# Patient Record
Sex: MALE | Race: WHITE | NOT HISPANIC OR LATINO | Employment: FULL TIME | URBAN - METROPOLITAN AREA
[De-identification: names, ages, dates, MRNs, and addresses within clinical notes are randomized per-mention and may not be internally consistent; named-entity substitution may affect disease eponyms.]

---
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
## 2019-02-05 ENCOUNTER — HOSPITAL ENCOUNTER (EMERGENCY)
Facility: CLINIC | Age: 19
Discharge: HOME | End: 2019-02-05
Attending: NURSE PRACTITIONER | Admitting: NURSE PRACTITIONER
Payer: COMMERCIAL

## 2019-02-05 VITALS
DIASTOLIC BLOOD PRESSURE: 80 MMHG | TEMPERATURE: 97.7 F | WEIGHT: 210 LBS | RESPIRATION RATE: 18 BRPM | SYSTOLIC BLOOD PRESSURE: 147 MMHG | BODY MASS INDEX: 30.06 KG/M2 | HEIGHT: 70 IN | OXYGEN SATURATION: 96 %

## 2019-02-05 DIAGNOSIS — J06.9 VIRAL URI WITH COUGH: ICD-10-CM

## 2019-02-05 PROCEDURE — G0463 HOSPITAL OUTPT CLINIC VISIT: HCPCS | Performed by: NURSE PRACTITIONER

## 2019-02-05 PROCEDURE — 99214 OFFICE O/P EST MOD 30 MIN: CPT | Mod: Z6 | Performed by: NURSE PRACTITIONER

## 2019-02-05 RX ORDER — ALBUTEROL SULFATE 90 UG/1
2 AEROSOL, METERED RESPIRATORY (INHALATION) EVERY 6 HOURS PRN
Qty: 1 INHALER | Refills: 0 | Status: SHIPPED | OUTPATIENT
Start: 2019-02-05 | End: 2019-03-07

## 2019-02-05 ASSESSMENT — ENCOUNTER SYMPTOMS
VOMITING: 0
SORE THROAT: 0
SPEECH DIFFICULTY: 0
RHINORRHEA: 1
CONSTIPATION: 0
DYSURIA: 0
WHEEZING: 0
NUMBNESS: 0
DIAPHORESIS: 0
EYE PAIN: 0
CHILLS: 0
SINUS PRESSURE: 1
SINUS PAIN: 0
DIARRHEA: 0
DIFFICULTY URINATING: 0
NAUSEA: 0
WOUND: 0
SHORTNESS OF BREATH: 1
COUGH: 1
FEVER: 0
WEAKNESS: 0
ABDOMINAL PAIN: 0
CONFUSION: 0

## 2019-02-05 ASSESSMENT — MIFFLIN-ST. JEOR: SCORE: 1973.8

## 2019-02-05 NOTE — ED AVS SNAPSHOT
[facility] Emergency Department  [address] [address]  Phone:  [phone]  Fax:  [phone]                                    Pete Blank   MRN: [id number]    Department:  [facility] Emergency Department   Date of Visit:  2/5/2019           After Visit Summary Signature Page    I have received my discharge instructions, and my questions have been answered. I have discussed any challenges I see with this plan with the nurse or doctor.    ..........................................................................................................................................  Patient/Patient Representative Signature      ..........................................................................................................................................  Patient Representative Print Name and Relationship to Patient    ..................................................               ................................................  Date                                   Time    ..........................................................................................................................................  Reviewed by Signature/Title    ...................................................              ..............................................  Date                                               Time          22EPIC Rev 08/18

## 2019-02-05 NOTE — LETTER
February 5, 2019      To Whom It May Concern:      Pete Blank was seen in our Emergency Department today, 02/05/19.  I expect his condition to improve over the next few days.  He may return to work/school when improved.    Sincerely,        SCOTT Blanco CNP

## 2019-02-05 NOTE — ED PROVIDER NOTES
"  History     Chief Complaint   Patient presents with     URI     HPI  SUBJECTIVE: Pete Blank  is here today because of:Cough and \"Cold\"  The patient has had symptoms of cough, sore throat, nasal congestion/runny nose, headache, facial pressure and shortness of breath.   Onset of symptoms was 2 days ago. Course of illness is same.  Patient admits to exposure to illness at home or work/school.   Patient denies fever, earache, nausea, vomiting, diarrhea, sinus pain, chest congestion, wheezing and myalgias  Treatment measures tried include airborne.  Patient is a smoker    Allergies:  Allergies   Allergen Reactions     Nkda [No Known Drug Allergies]        Problem List:    Patient Active Problem List    Diagnosis Date Noted     Depression 12/09/2014     Priority: Medium     Prozac was never started. Wellbutrin has been the medication since the summer of 2014.        Suicidal ideation 06/26/2014     Priority: Medium     Attention deficit hyperactivity disorder (ADHD) 02/23/2007     Priority: Medium     01/10/07: Doing well on Adderall XR 20 mg qd.    10/3/2008  New school this year.  Increase in oppositional behaviors, impulsive and fidgeting, and difficulty with peers on follow up [place].   11/8/2011  Currently taking adderall Xr 30mg and Adderall 15 mg q pm.  Having more late morning and afternoon sx.  Increase to 40mg XR am and continue to adjust pm dose as needed.   5/7/2012  Multiple slightly early refill noted.  rx filled for 90 day with 3 predated rx given today.  Refills discussed.  11/28/2012  Adderall XR 40mg q am and Adderall 15mg q am and 10mg q pm.     2/5/2013  Due to poor afternoon performance dose changed to Adderall XR 20mg tablet 2 tab am and noon with one month follow up.   3/7/2013  rx tampering suspected.  See recent note.  No further stimulant other than current trial of daytrana.  All rx to be carried to [facility] pharmacy and filled there.  No written rx given to parents.  No other provider " to fill unless arranged due to provider absence.  Follow up 2 wk with mame from school.   4/25/2013  Follow up visit.  Daytrana 15mg rx taken to pharmacy one month supply.  Follow up with BP  6/25/2013  Increase to Daytrana 20.  Mother to bring BP reading from home.  Follow up 2-3 wk with new dose.     He was at [facility] in the summer of 2014. He was tried on Wellbutrin 300 mg daily and this was not effective.   Problem list name updated by automated process. Provider to review          Past Medical History:    Past Medical History:   Diagnosis Date     Attention deficit disorder without mention of hyperactivity        Past Surgical History:    Past Surgical History:   Procedure Laterality Date     MYRINGOTOMY, INSERT TUBE(S), ADENOIDECTOMY, COMBINED  2002       Family History:    Family History   Problem Relation Age of Onset     Asthma Mother      Allergies Mother      Cancer Maternal Grandmother         lung     Cancer Maternal Grandfather         prostate     C.A.D. No family hx of      Diabetes No family hx of      Hypertension No family hx of      Psychotic Disorder No family hx of        Social History:  Marital Status:  Single [1]  Social History     Tobacco Use     Smoking status: Current Every Day Smoker     Types: Cigarettes     Smokeless tobacco: Never Used     Tobacco comment: 8 per day-started a few months ago   Substance Use Topics     Alcohol use: No     Drug use: No        Medications:      albuterol (PROAIR HFA/PROVENTIL HFA/VENTOLIN HFA) 108 (90 Base) MCG/ACT inhaler   cetirizine (ZYRTEC) 10 MG tablet         Review of Systems   Constitutional: Negative for chills, diaphoresis and fever.   HENT: Positive for congestion, rhinorrhea and sinus pressure. Negative for ear pain, sinus pain and sore throat.    Eyes: Negative for pain and visual disturbance.   Respiratory: Positive for cough and shortness of breath. Negative for wheezing.    Cardiovascular: Negative for chest pain.  "  Gastrointestinal: Negative for abdominal pain, constipation, diarrhea, nausea and vomiting.   Genitourinary: Negative for difficulty urinating and dysuria.   Skin: Negative for rash and wound.   Neurological: Negative for speech difficulty, weakness and numbness.   Psychiatric/Behavioral: Negative for confusion and self-injury.   All other systems reviewed and are negative.      Physical Exam   BP: 147/80  Heart Rate: 95  Temp: 97.7  F (36.5  C)  Resp: 18  Height: 177.8 cm (5' 10\")  Weight: 95.3 kg (210 lb)  SpO2: 96 %      Physical Exam   Constitutional: He is oriented to person, place, and time. He appears well-developed and well-nourished. He is cooperative.  Non-toxic appearance. He appears ill.   HENT:   Head: Normocephalic and atraumatic. Head is without contusion.   Right Ear: Hearing, tympanic membrane, external ear and ear canal normal. No drainage or tenderness.   Left Ear: Hearing, external ear and ear canal normal. No drainage or tenderness.   Nose: Mucosal edema and rhinorrhea present. Right sinus exhibits no maxillary sinus tenderness and no frontal sinus tenderness. Left sinus exhibits no maxillary sinus tenderness and no frontal sinus tenderness.   Mouth/Throat: Uvula is midline and mucous membranes are normal. No trismus in the jaw. No uvula swelling. No posterior oropharyngeal edema or posterior oropharyngeal erythema. Tonsils are 1+ on the right. Tonsils are 1+ on the left. No tonsillar exudate.   Eyes: Conjunctivae are normal. Pupils are equal, round, and reactive to light. Right eye exhibits no discharge. Left eye exhibits no discharge. No scleral icterus.   Neck: Normal range of motion. No tracheal deviation present.   Cardiovascular: Normal rate, regular rhythm, normal heart sounds and intact distal pulses. Exam reveals no gallop and no friction rub.   No murmur heard.  Pulmonary/Chest: Effort normal. No stridor. No respiratory distress. He has wheezes. He has no rales.   Lymphadenopathy:    "  He has cervical adenopathy (shotty lymph nodes).   Neurological: He is alert and oriented to person, place, and time.   Skin: Skin is warm. Capillary refill takes less than 2 seconds. No rash noted. He is not diaphoretic.   Psychiatric: He has a normal mood and affect.   Nursing note and vitals reviewed.      ED Course        Procedures    No results found for this or any previous visit (from the past 24 hour(s)).    Medications - No data to display    Assessments & Plan (with Medical Decision Making)     I have reviewed the nursing notes.    I have reviewed the findings, diagnosis, plan and need for follow up with the patient.  Medical Decision Making:  CXR is not indicated.  Rapid Strep test is not indicated.     Assessment:  1) Viral upper respiratory illness, Tobacco dependency and wheezing.    PLAN:  Albuterol prn  Use increase fluids and rest.   Follow up with any questions or problems         Medication List      Started    albuterol 108 (90 Base) MCG/ACT inhaler  Commonly known as:  PROAIR HFA/PROVENTIL HFA/VENTOLIN HFA  2 puffs, Inhalation, EVERY 6 HOURS PRN            Final diagnoses:   Viral URI with cough       2/5/2019   [facility] EMERGENCY DEPARTMENT     Angie Benavides, SCOTT CNP  02/05/19 0091

## 2021-10-11 ENCOUNTER — OFFICE VISIT (OUTPATIENT)
Dept: URGENT CARE | Facility: URGENT CARE | Age: 21
End: 2021-10-11
Payer: COMMERCIAL

## 2021-10-11 VITALS
HEART RATE: 104 BPM | TEMPERATURE: 97.8 F | DIASTOLIC BLOOD PRESSURE: 90 MMHG | RESPIRATION RATE: 18 BRPM | SYSTOLIC BLOOD PRESSURE: 124 MMHG | OXYGEN SATURATION: 98 %

## 2021-10-11 DIAGNOSIS — Z20.822 SUSPECTED 2019 NOVEL CORONAVIRUS INFECTION: Primary | ICD-10-CM

## 2021-10-11 DIAGNOSIS — R03.0 ELEVATED BP WITHOUT DIAGNOSIS OF HYPERTENSION: ICD-10-CM

## 2021-10-11 PROCEDURE — 99000 SPECIMEN HANDLING OFFICE-LAB: CPT | Performed by: PHYSICIAN ASSISTANT

## 2021-10-11 PROCEDURE — U0005 INFEC AGEN DETEC AMPLI PROBE: HCPCS | Mod: 90 | Performed by: PHYSICIAN ASSISTANT

## 2021-10-11 PROCEDURE — 99203 OFFICE O/P NEW LOW 30 MIN: CPT | Performed by: PHYSICIAN ASSISTANT

## 2021-10-11 PROCEDURE — U0003 INFECTIOUS AGENT DETECTION BY NUCLEIC ACID (DNA OR RNA); SEVERE ACUTE RESPIRATORY SYNDROME CORONAVIRUS 2 (SARS-COV-2) (CORONAVIRUS DISEASE [COVID-19]), AMPLIFIED PROBE TECHNIQUE, MAKING USE OF HIGH THROUGHPUT TECHNOLOGIES AS DESCRIBED BY CMS-2020-01-R: HCPCS | Mod: 90 | Performed by: PHYSICIAN ASSISTANT

## 2021-10-11 NOTE — LETTER
[facility] URGENT CARE [place] [id number] [address]  Phone: [phone]  Fax: [phone]    October 11, 2021        Pete Blank  [address] [address]          To whom it may concern:    RE: Pete Blank    Patient was seen and treated today at our clinic and missed work 10/11/21 through 10/15/21.    Please contact me for questions or concerns.      Sincerely,        Leida Hoskins PA-C

## 2021-10-11 NOTE — PROGRESS NOTES
Assessment & Plan     Suspected 2019 novel coronavirus infection  COVID pending. Continue with supportive care. Get plenty of rest and push fluids. Can use Tylenol and/or ibuprofen as needed for pain and/or fever control. Discussed quarantine guidelines. Return to clinic if symptoms worsen or do not improve; otherwise follow up as needed       - Symptomatic COVID-19 Virus (Coronavirus) by PCR Nasopharyngeal    Elevated BP without diagnosis of hypertension  Monitor blood pressure at home and if average pressure is greater than or equal to 140/90 follow up with primary care provider                 Tobacco Cessation:   reports that he has been smoking cigarettes. He has never used smokeless tobacco.          Return in about 1 week (around 10/18/2021), or if symptoms worsen or fail to improve.    VIRGINIA Bautista [facility] URGENT CARE [place]          Subjective   Chief Complaint   Patient presents with     Cough     3 days cough, loss of taste smell, vomiting, sob, upset stomach, sore throat, congestion.         HPI     URI Adult    Onset of symptoms was 3 day(s) ago.  Course of illness is same.    Severity moderate  Current and Associated symptoms: cough, congestion, sore throat, loss of taste  Treatment measures tried include Tylenol/Ibuprofen.  Predisposing factors include None.  Had COVID last year              Review of Systems   Constitutional, HEENT, cardiovascular, pulmonary, gi and gu systems are negative, except as otherwise noted.      Objective    BP (!) 124/90 (BP Location: Right arm, Patient Position: Sitting, Cuff Size: Adult Large)   Pulse 104   Temp 97.8  F (36.6  C) (Tympanic)   Resp 18   SpO2 98%   There is no height or weight on file to calculate BMI.  Physical Exam  Constitutional:       General: He is not in acute distress.     Appearance: He is well-developed.   HENT:      Head: Normocephalic and atraumatic.      Right Ear: Tympanic membrane and ear canal normal.       Left Ear: Tympanic membrane and ear canal normal.   Eyes:      Conjunctiva/sclera: Conjunctivae normal.   Cardiovascular:      Rate and Rhythm: Normal rate and regular rhythm.   Pulmonary:      Effort: Pulmonary effort is normal.      Breath sounds: Normal breath sounds.   Skin:     General: Skin is warm and dry.      Findings: No rash.   Psychiatric:         Behavior: Behavior normal.

## 2021-10-13 LAB — SARS-COV-2 RNA RESP QL NAA+PROBE: NOT DETECTED

## 2023-03-07 ENCOUNTER — HOSPITAL ENCOUNTER (EMERGENCY)
Facility: CLINIC | Age: 23
Discharge: HOME | End: 2023-03-07
Attending: FAMILY MEDICINE | Admitting: FAMILY MEDICINE
Payer: COMMERCIAL

## 2023-03-07 VITALS
HEART RATE: 72 BPM | WEIGHT: 225 LBS | HEIGHT: 72 IN | TEMPERATURE: 98.2 F | SYSTOLIC BLOOD PRESSURE: 148 MMHG | OXYGEN SATURATION: 97 % | BODY MASS INDEX: 30.48 KG/M2 | DIASTOLIC BLOOD PRESSURE: 107 MMHG | RESPIRATION RATE: 16 BRPM

## 2023-03-07 DIAGNOSIS — K04.7 DENTAL INFECTION: ICD-10-CM

## 2023-03-07 PROCEDURE — 99283 EMERGENCY DEPT VISIT LOW MDM: CPT | Performed by: FAMILY MEDICINE

## 2023-03-07 ASSESSMENT — ACTIVITIES OF DAILY LIVING (ADL): ADLS_ACUITY_SCORE: 35

## 2023-03-07 NOTE — LETTER
March 7, 2023      To Whom It May Concern:      Pete LORI Blank was seen in our Emergency Department today, 03/07/23.    Sincerely,        Yuridia Gomez RN

## 2023-03-07 NOTE — ED TRIAGE NOTES
"Pt here with left lower tooth pain, possible abscess. Pt has not tried to see a dentist. Pt states that this has happened before, \"I took a couple of the amoxicillin I had left from last time, but I need more of that\".      Triage Assessment     Row Name 03/07/23 1037       Triage Assessment (Adult)    Airway WDL WDL       Respiratory WDL    Respiratory WDL WDL       Skin Circulation/Temperature WDL    Skin Circulation/Temperature WDL WDL       Cardiac WDL    Cardiac WDL WDL       Peripheral/Neurovascular WDL    Peripheral Neurovascular WDL WDL       Cognitive/Neuro/Behavioral WDL    Cognitive/Neuro/Behavioral WDL WDL              "

## 2023-03-07 NOTE — DISCHARGE INSTRUCTIONS
Augmentin 875 mg p.o. twice daily x10 days.  Please make an appointment to see a dental provider as soon as possible as we discussed.  Tylenol/ibuprofen as needed for pain.  Return to the emergency department if worse or changes.

## 2023-03-07 NOTE — ED PROVIDER NOTES
History     Chief Complaint   Patient presents with     Dental Problem     HPI  Pete Blank is a 22 year old male, past medical history is significant for depression, ADHD, presents to the emergency department with concerns of dental pain and swelling left mandible area.  He is aware that he has widespread dental caries and needs to see an oral surgeon but has not made an appointment thus far.  He denies any fever chills or sweats no nausea or vomiting.  About 2 weeks ago he had 4 days left of Augmentin and reports improvement on that with duration of symptoms stuttering over the past month or so.  Unfortunately he did not have a full course of Augmentin and now presents with reaccumulation or rather increased pain and swelling in the left mandible area.    Allergies:  Allergies   Allergen Reactions     Nkda [No Known Drug Allergies]        Problem List:    Patient Active Problem List    Diagnosis Date Noted     Depression 12/09/2014     Priority: Medium     Prozac was never started. Wellbutrin has been the medication since the summer of 2014.        Suicidal ideation 06/26/2014     Priority: Medium     Attention deficit hyperactivity disorder (ADHD) 02/23/2007     Priority: Medium     01/10/07: Doing well on Adderall XR 20 mg qd.    10/3/2008  New school this year.  Increase in oppositional behaviors, impulsive and fidgeting, and difficulty with peers on follow up [place].   11/8/2011  Currently taking adderall Xr 30mg and Adderall 15 mg q pm.  Having more late morning and afternoon sx.  Increase to 40mg XR am and continue to adjust pm dose as needed.   5/7/2012  Multiple slightly early refill noted.  rx filled for 90 day with 3 predated rx given today.  Refills discussed.  11/28/2012  Adderall XR 40mg q am and Adderall 15mg q am and 10mg q pm.     2/5/2013  Due to poor afternoon performance dose changed to Adderall XR 20mg tablet 2 tab am and noon with one month follow up.   3/7/2013  rx tampering  suspected.  See recent note.  No further stimulant other than current trial of daytrana.  All rx to be carried to [facility] pharmacy and filled there.  No written rx given to parents.  No other provider to fill unless arranged due to provider absence.  Follow up 2 wk with mame from school.   4/25/2013  Follow up visit.  Daytrana 15mg rx taken to pharmacy one month supply.  Follow up with BP  6/25/2013  Increase to Daytrana 20.  Mother to bring BP reading from home.  Follow up 2-3 wk with new dose.     He was at [facility] in the summer of 2014. He was tried on Wellbutrin 300 mg daily and this was not effective.   Problem list name updated by automated process. Provider to review          Past Medical History:    Past Medical History:   Diagnosis Date     Attention deficit disorder without mention of hyperactivity        Past Surgical History:    Past Surgical History:   Procedure Laterality Date     MYRINGOTOMY, INSERT TUBE(S), ADENOIDECTOMY, COMBINED  2002       Family History:    Family History   Problem Relation Age of Onset     Asthma Mother      Allergies Mother      Cancer Maternal Grandmother         lung     Cancer Maternal Grandfather         prostate     C.A.D. No family hx of      Diabetes No family hx of      Hypertension No family hx of      Psychotic Disorder No family hx of        Social History:  Marital Status:  Single [1]  Social History     Tobacco Use     Smoking status: Every Day     Types: Cigarettes     Smokeless tobacco: Never     Tobacco comments:     8 per day-started a few months ago   Substance Use Topics     Alcohol use: No     Drug use: No        Medications:    amoxicillin-clavulanate (AUGMENTIN) 875-125 MG tablet  albuterol (PROAIR HFA/PROVENTIL HFA/VENTOLIN HFA) 108 (90 Base) MCG/ACT inhaler  cetirizine (ZYRTEC) 10 MG tablet          Review of Systems   All other systems reviewed and are negative.      Physical Exam   BP: (!) 148/107  Pulse: 72  Temp: 98.2  F (36.8  C)  Resp:  16  Height: 182.9 cm (6')  Weight: 102.1 kg (225 lb)  SpO2: 97 %      Physical Exam  Vitals and nursing note reviewed.   Constitutional:       General: He is not in acute distress.     Appearance: Normal appearance. He is normal weight. He is not ill-appearing.   HENT:      Head: Normocephalic and atraumatic.      Right Ear: Tympanic membrane normal.      Left Ear: Tympanic membrane normal.      Nose: Nose normal.      Mouth/Throat:      Pharynx: Oropharynx is clear.      Comments: Widespread dental caries most of the dentition on the left mandible area is eroded down to the gumline.  There is diffuse erythema but no fluctuance to suggest abscess.  Tender to palpation.  Tender anterior and posterior left-sided cervical lymphadenopathy  Eyes:      Conjunctiva/sclera: Conjunctivae normal.      Pupils: Pupils are equal, round, and reactive to light.   Neurological:      Mental Status: He is alert.         ED Course                 Procedures              Critical Care time:  none               No results found for this or any previous visit (from the past 24 hour(s)).    Medications - No data to display    Assessments & Plan (with Medical Decision Making)   22-year-old male with widespread dental caries who presents with concerns of pain and swelling in his left mandible area partial treatment with Augmentin as discussed in the HPI and documented with respect abnormals on exam.  No definite abscess at this point.  We will place the patient back on Augmentin for a full 10-day course and refer to oral surgery as I do not think with the degree of dental decay that this will be something that a dentist would be able to do.  I discussed this with him.  Return criteria for the ER were discussed.      Disclaimer: This note consists of symbols derived from keyboarding, dictation and/or voice recognition software. As a result, there may be errors in the script that have gone undetected. Please consider this when interpreting  information found in this chart.      I have reviewed the nursing notes.    I have reviewed the findings, diagnosis, plan and need for follow up with the patient.             New Prescriptions    AMOXICILLIN-CLAVULANATE (AUGMENTIN) 875-125 MG TABLET    Take 1 tablet by mouth 2 times daily for 10 days       Final diagnoses:   Dental infection       3/7/2023   [facility] EMERGENCY DEPT     Darío Redding MD  03/07/23 [id number]

## 2025-03-29 ENCOUNTER — HOSPITAL ENCOUNTER (EMERGENCY)
Facility: CLINIC | Age: 25
Discharge: HOME | End: 2025-03-29
Attending: PHYSICIAN ASSISTANT | Admitting: PHYSICIAN ASSISTANT
Payer: COMMERCIAL

## 2025-03-29 ENCOUNTER — HOSPITAL ENCOUNTER (EMERGENCY)
Facility: CLINIC | Age: 25
End: 2025-03-29

## 2025-03-29 VITALS
HEART RATE: 89 BPM | DIASTOLIC BLOOD PRESSURE: 87 MMHG | RESPIRATION RATE: 18 BRPM | OXYGEN SATURATION: 98 % | SYSTOLIC BLOOD PRESSURE: 132 MMHG | TEMPERATURE: 97.5 F

## 2025-03-29 DIAGNOSIS — J02.9 ACUTE PHARYNGITIS: ICD-10-CM

## 2025-03-29 DIAGNOSIS — J06.9 URI (UPPER RESPIRATORY INFECTION): ICD-10-CM

## 2025-03-29 LAB — S PYO DNA THROAT QL NAA+PROBE: NOT DETECTED

## 2025-03-29 PROCEDURE — G0463 HOSPITAL OUTPT CLINIC VISIT: HCPCS | Performed by: PHYSICIAN ASSISTANT

## 2025-03-29 PROCEDURE — 99213 OFFICE O/P EST LOW 20 MIN: CPT | Performed by: PHYSICIAN ASSISTANT

## 2025-03-29 PROCEDURE — 87651 STREP A DNA AMP PROBE: CPT | Performed by: PHYSICIAN ASSISTANT

## 2025-03-29 RX ORDER — BUPROPION HYDROCHLORIDE 150 MG/1
150 TABLET ORAL
COMMUNITY
Start: 2025-01-24

## 2025-03-29 ASSESSMENT — ACTIVITIES OF DAILY LIVING (ADL)
ADLS_ACUITY_SCORE: 41
ADLS_ACUITY_SCORE: 41

## 2025-03-29 ASSESSMENT — COLUMBIA-SUICIDE SEVERITY RATING SCALE - C-SSRS
6. HAVE YOU EVER DONE ANYTHING, STARTED TO DO ANYTHING, OR PREPARED TO DO ANYTHING TO END YOUR LIFE?: NO
2. HAVE YOU ACTUALLY HAD ANY THOUGHTS OF KILLING YOURSELF IN THE PAST MONTH?: NO
1. IN THE PAST MONTH, HAVE YOU WISHED YOU WERE DEAD OR WISHED YOU COULD GO TO SLEEP AND NOT WAKE UP?: NO
6. HAVE YOU EVER DONE ANYTHING, STARTED TO DO ANYTHING, OR PREPARED TO DO ANYTHING TO END YOUR LIFE?: NO
2. HAVE YOU ACTUALLY HAD ANY THOUGHTS OF KILLING YOURSELF IN THE PAST MONTH?: NO
1. IN THE PAST MONTH, HAVE YOU WISHED YOU WERE DEAD OR WISHED YOU COULD GO TO SLEEP AND NOT WAKE UP?: NO

## 2025-03-29 ASSESSMENT — ENCOUNTER SYMPTOMS
COUGH: 1
CONSTITUTIONAL NEGATIVE: 1
SORE THROAT: 1
FEVER: 0

## 2025-03-29 NOTE — LETTER
March 29, 2025      To Whom It May Concern:      Pete Blank was seen in our Urgent Care today, 03/29/25.  Please excuse from work today and tomorrow due to illness.  Thank you.      Sincerely,        Natali Vizcaino PA-C  Electronically signed

## 2025-03-29 NOTE — ED PROVIDER NOTES
History     Chief Complaint   Patient presents with    Pharyngitis     Requesting strep test today     Letter for School/Work     HPI  Pete Blank is a 24 year old male who presents to Urgent Care with complaints of sore throat for the past 3 days.  Patient also complains of nasal congestion and cough.  Wanting to rule out strep throat.  Denies fevers, chills, rash, neck pain/stiffness, nausea, vomiting, diarrhea, abdominal pain, chest pain, or shortness of breath.      Allergies:  Allergies   Allergen Reactions    Nkda [No Known Drug Allergy]        Problem List:    Patient Active Problem List    Diagnosis Date Noted    Depression 12/09/2014     Priority: Medium     Prozac was never started. Wellbutrin has been the medication since the summer of 2014.       Suicidal ideation 06/26/2014     Priority: Medium    Attention deficit hyperactivity disorder (ADHD) 02/23/2007     Priority: Medium     01/10/07: Doing well on Adderall XR 20 mg qd.    10/3/2008  New school this year.  Increase in oppositional behaviors, impulsive and fidgeting, and difficulty with peers on follow up mame.   11/8/2011  Currently taking adderall Xr 30mg and Adderall 15 mg q pm.  Having more late morning and afternoon sx.  Increase to 40mg XR am and continue to adjust pm dose as needed.   5/7/2012  Multiple slightly early refill noted.  rx filled for 90 day with 3 predated rx given today.  Refills discussed.  11/28/2012  Adderall XR 40mg q am and Adderall 15mg q am and 10mg q pm.     2/5/2013  Due to poor afternoon performance dose changed to Adderall XR 20mg tablet 2 tab am and noon with one month follow up.   3/7/2013  rx tampering suspected.  See recent note.  No further stimulant other than current trial of daytrana.  [facility].  No written rx given to parents.  No other provider to fill unless arranged due to provider absence.  Follow up 2 wk with mame from school.    4/25/2013  Follow up visit.  Daytrana 15mg rx taken to pharmacy one month supply.  Follow up with BP  6/25/2013  Increase to Daytrana 20.  Mother to bring BP reading from home.  Follow up 2-3 wk with new dose.     He was at [facility] in the summer of 2014. He was tried on Wellbutrin 300 mg daily and this was not effective.   Problem list name updated by automated process. Provider to review          Past Medical History:    Past Medical History:   Diagnosis Date    Attention deficit disorder without mention of hyperactivity        Past Surgical History:    Past Surgical History:   Procedure Laterality Date    MYRINGOTOMY, INSERT TUBE(S), ADENOIDECTOMY, COMBINED  2002       Family History:    Family History   Problem Relation Age of Onset    Asthma Mother     Allergies Mother     Cancer Maternal Grandmother         lung    Cancer Maternal Grandfather         prostate    C.A.D. No family hx of     Diabetes No family hx of     Hypertension No family hx of     Psychotic Disorder No family hx of        Social History:  Marital Status:  Single [1]  Social History     Tobacco Use    Smoking status: Every Day     Types: Cigarettes    Smokeless tobacco: Never    Tobacco comments:     8 per day-started a few months ago   Substance Use Topics    Alcohol use: No    Drug use: No        Medications:    buPROPion (WELLBUTRIN XL) 150 MG 24 hr tablet  albuterol (PROAIR HFA/PROVENTIL HFA/VENTOLIN HFA) 108 (90 Base) MCG/ACT inhaler  cetirizine (ZYRTEC) 10 MG tablet          Review of Systems   Constitutional: Negative.  Negative for fever.   HENT:  Positive for congestion and sore throat.    Respiratory:  Positive for cough.    All other systems reviewed and are negative.      Physical Exam   BP: 132/87  Pulse: 89  Temp: 97.5  F (36.4  C)  Resp: 18  SpO2: 98 %      Physical Exam  Constitutional:       General: He is not in acute distress.     Appearance: Normal appearance. He is well-developed. He is not ill-appearing,  toxic-appearing or diaphoretic.   HENT:      Head: Normocephalic and atraumatic.      Right Ear: Tympanic membrane, ear canal and external ear normal.      Left Ear: Tympanic membrane, ear canal and external ear normal.      Nose: Congestion and rhinorrhea present.      Mouth/Throat:      Lips: Pink.      Mouth: Mucous membranes are moist.      Pharynx: Uvula midline. Posterior oropharyngeal erythema present. No pharyngeal swelling, oropharyngeal exudate, uvula swelling or postnasal drip.      Tonsils: No tonsillar exudate or tonsillar abscesses.   Eyes:      Extraocular Movements: Extraocular movements intact.      Conjunctiva/sclera: Conjunctivae normal.      Pupils: Pupils are equal, round, and reactive to light.   Cardiovascular:      Rate and Rhythm: Normal rate and regular rhythm.      Heart sounds: Normal heart sounds.   Pulmonary:      Effort: Pulmonary effort is normal. No respiratory distress.      Breath sounds: Normal breath sounds. No stridor. No wheezing, rhonchi or rales.   Musculoskeletal:      Cervical back: Full passive range of motion without pain, normal range of motion and neck supple. No rigidity. Normal range of motion.   Lymphadenopathy:      Cervical: No cervical adenopathy.   Skin:     General: Skin is warm and dry.   Neurological:      Mental Status: He is alert and oriented to person, place, and time.   Psychiatric:         Behavior: Behavior is cooperative.         ED Course        Procedures      Results for orders placed or performed during the hospital encounter of 03/29/25 (from the past 24 hours)   Group A Streptococcus PCR Throat Swab    Specimen: Throat; Swab   Result Value Ref Range    Group A strep by PCR Not Detected Not Detected    Narrative    The Xpert Xpress Strep A test, performed on the [employer], is a rapid, qualitative in vitro diagnostic test for the detection of Streptococcus pyogenes (Group A ß-hemolytic Streptococcus, Strep A) in throat swab  specimens from patients with signs and symptoms of pharyngitis. The Xpert Xpress Strep A test can be used as an aid in the diagnosis of Group A Streptococcal pharyngitis. The assay is not intended to monitor treatment for Group A Streptococcus infections. The Xpert Xpress Strep A test utilizes an automated real-time polymerase chain reaction (PCR) to detect Streptococcus pyogenes DNA.       Medications - No data to display    Assessments & Plan (with Medical Decision Making)     Pt is a 24 year old male who presents to Urgent Care with complaints of sore throat for the past 3 days.  Patient also complains of nasal congestion and cough.      Pt is afebrile on arrival.  Exam as above.  Strep testing was negative.  Discussed results with patient.  Encouraged symptomatic treatments at home.  Return precautions were reviewed.  Hand-outs were provided.    Patient was instructed to follow-up with PCP for continued care and management.  He is to return to the ED for persistent and/or worsening symptoms.  Patient expressed understanding of the diagnosis and plan and was discharged home in good condition.    I have reviewed the nursing notes.    I have reviewed the findings, diagnosis, plan and need for follow up with the patient.      New Prescriptions    No medications on file       Final diagnoses:   Acute pharyngitis   URI (upper respiratory infection)       3/29/2025   [facility] EMERGENCY DEPT      Disclaimer:  This note consists of symbols derived from keyboarding, dictation and/or voice recognition software.  As a result, there may be errors in the script that have gone undetected.  Please consider this when interpreting information found in this chart.     Natali Vizcaino PA-C  03/29/25 1242

## 2025-04-19 ENCOUNTER — HEALTH MAINTENANCE LETTER (OUTPATIENT)
Age: 25
End: 2025-04-19

## 2025-05-11 ENCOUNTER — HOSPITAL ENCOUNTER (EMERGENCY)
Facility: CLINIC | Age: 25
Discharge: HOME | End: 2025-05-11
Attending: FAMILY MEDICINE | Admitting: FAMILY MEDICINE
Payer: COMMERCIAL

## 2025-05-11 VITALS
HEIGHT: 71 IN | BODY MASS INDEX: 33.6 KG/M2 | OXYGEN SATURATION: 97 % | RESPIRATION RATE: 18 BRPM | HEART RATE: 106 BPM | WEIGHT: 240 LBS | DIASTOLIC BLOOD PRESSURE: 80 MMHG | SYSTOLIC BLOOD PRESSURE: 140 MMHG | TEMPERATURE: 98.2 F

## 2025-05-11 DIAGNOSIS — K08.89 PAIN, DENTAL: ICD-10-CM

## 2025-05-11 DIAGNOSIS — K02.9 DENTAL DECAY: ICD-10-CM

## 2025-05-11 PROCEDURE — 99284 EMERGENCY DEPT VISIT MOD MDM: CPT | Performed by: FAMILY MEDICINE

## 2025-05-11 RX ORDER — OXYCODONE HYDROCHLORIDE 5 MG/1
5 TABLET ORAL EVERY 6 HOURS PRN
Qty: 10 TABLET | Refills: 0 | Status: SHIPPED | OUTPATIENT
Start: 2025-05-11

## 2025-05-11 RX ORDER — CLINDAMYCIN HYDROCHLORIDE 150 MG/1
150 CAPSULE ORAL 4 TIMES DAILY
Qty: 30 CAPSULE | Refills: 0 | Status: SHIPPED | OUTPATIENT
Start: 2025-05-11

## 2025-05-11 ASSESSMENT — COLUMBIA-SUICIDE SEVERITY RATING SCALE - C-SSRS
2. HAVE YOU ACTUALLY HAD ANY THOUGHTS OF KILLING YOURSELF IN THE PAST MONTH?: NO
1. IN THE PAST MONTH, HAVE YOU WISHED YOU WERE DEAD OR WISHED YOU COULD GO TO SLEEP AND NOT WAKE UP?: NO
6. HAVE YOU EVER DONE ANYTHING, STARTED TO DO ANYTHING, OR PREPARED TO DO ANYTHING TO END YOUR LIFE?: NO

## 2025-05-12 NOTE — ED PROVIDER NOTES
History     Chief Complaint   Patient presents with    Dental Problem     HPI    Pete Blank is a 24 year old male who comes in with dental pain.  This is located in the area of the #14 tooth.  However he has had dental decay for many years after history of cocaine abuse.  He does not have a dentist.  The past few days he has had increased pain in the jaw but no difficulty with swallowing or breathing and no systemic symptoms of illness.    Allergies:  Allergies   Allergen Reactions    Nkda [No Known Drug Allergy]        Problem List:    Patient Active Problem List    Diagnosis Date Noted    Depression 12/09/2014     Priority: Medium     Prozac was never started. Wellbutrin has been the medication since the summer of 2014.       Suicidal ideation 06/26/2014     Priority: Medium    Attention deficit hyperactivity disorder (ADHD) 02/23/2007     Priority: Medium     01/10/07: Doing well on Adderall XR 20 mg qd.    10/3/2008  New school this year.  Increase in oppositional behaviors, impulsive and fidgeting, and difficulty with peers on follow up mame.   11/8/2011  Currently taking adderall Xr 30mg and Adderall 15 mg q pm.  Having more late morning and afternoon sx.  Increase to 40mg XR am and continue to adjust pm dose as needed.   5/7/2012  Multiple slightly early refill noted.  rx filled for 90 day with 3 predated rx given today.  Refills discussed.  11/28/2012  Adderall XR 40mg q am and Adderall 15mg q am and 10mg q pm.     2/5/2013  Due to poor afternoon performance dose changed to Adderall XR 20mg tablet 2 tab am and noon with one month follow up.   3/7/2013  rx tampering suspected.  See recent note.  No further stimulant other than current trial of daytrana.  [facility].  No written rx given to parents.  No other provider to fill unless arranged due to provider absence.  Follow up 2 wk with mame from school.   4/25/2013  Follow up visit.  Daytrana  "15mg rx taken to pharmacy one month supply.  Follow up with BP  6/25/2013  Increase to Daytrana 20.  Mother to bring BP reading from home.  Follow up 2-3 wk with new dose.     He was at [facility] in the summer of 2014. He was tried on Wellbutrin 300 mg daily and this was not effective.   Problem list name updated by automated process. Provider to review          Past Medical History:    Past Medical History:   Diagnosis Date    Attention deficit disorder without mention of hyperactivity        Past Surgical History:    Past Surgical History:   Procedure Laterality Date    MYRINGOTOMY, INSERT TUBE(S), ADENOIDECTOMY, COMBINED  2002       Family History:    Family History   Problem Relation Age of Onset    Asthma Mother     Allergies Mother     Cancer Maternal Grandmother         lung    Cancer Maternal Grandfather         prostate    C.A.D. No family hx of     Diabetes No family hx of     Hypertension No family hx of     Psychotic Disorder No family hx of        Social History:  Marital Status:  Single [1]  Social History     Tobacco Use    Smoking status: Every Day     Types: Cigarettes    Smokeless tobacco: Never    Tobacco comments:     8 per day-started a few months ago   Substance Use Topics    Alcohol use: No    Drug use: No        Medications:    albuterol (PROAIR HFA/PROVENTIL HFA/VENTOLIN HFA) 108 (90 Base) MCG/ACT inhaler  buPROPion (WELLBUTRIN XL) 150 MG 24 hr tablet  cetirizine (ZYRTEC) 10 MG tablet  clindamycin (CLEOCIN) 150 MG capsule  oxyCODONE (ROXICODONE) 5 MG tablet      Review of Systems  All other systems are reviewed and are negative    Physical Exam   BP: (!) 140/80  Pulse: 106  Temp: 98.2  F (36.8  C)  Resp: 18  Height: 180.3 cm (5' 11\")  Weight: 108.9 kg (240 lb)  SpO2: 97 %      Physical Exam    Nursing note and vitals were reviewed.  Constitutional: Awake and alert, adequately nourished and developed appearing 24-year-old in no apparent discomfort, who does not appear acutely ill, and who " answers questions appropriately and cooperates with examination.  HEENT: Speech is fluent.  Voice quality is normal.  No facial swelling is present.  No trismus is present.  No swelling in the submandibular space.  Essentially all of the teeth past the bicuspids on the upper and lower jaw are worn away down to the gumline without areas of swelling but with some erythema around the #14 tooth.  EOMI.   Neck: Freely mobile.  No adenopathy.  No masses.  No swelling.  Pulmonary/Chest: Breathing is unlabored.   Neurological: Alert, oriented, thought content logical, coherent   Psychiatric: Affect broad and appropriate.    ED Course        Procedures              Critical Care time:  none     None         No results found for this or any previous visit (from the past 24 hours).    Medications - No data to display    Assessments & Plan (with Medical Decision Making)     24-year-old with history of severe dental decay presented with new dental pain in the past few days and his lower jaw on the left side with some mild erythema on the gums surrounding decayed tooth.  He does not have trismus.  There is no evidence of a deep space neck infection.  There is no facial or neck swelling.  Speech is normal and swallowing is normal.  Discussed with him the need for dental care.  Gave him my list of dentists.  Initiated antibiotics with clindamycin and gave him some pain medication.  I discussed with him that he needs to be seen if he develops facial swelling, trismus, fevers or other significant symptoms prior to seeing the dentist.    I have reviewed the nursing notes.    I have reviewed the findings, diagnosis, plan and need for follow up with the patient.         New Prescriptions    CLINDAMYCIN (CLEOCIN) 150 MG CAPSULE    Take 1 capsule (150 mg) by mouth 4 times daily.    OXYCODONE (ROXICODONE) 5 MG TABLET    Take 1 tablet (5 mg) by mouth every 6 hours as needed for severe pain.       Final diagnoses:   Pain, dental   Dental  decay       5/11/2025   [facility] EMERGENCY DEPT       Kendall Castano MD  05/11/25 2049

## 2025-05-12 NOTE — DISCHARGE INSTRUCTIONS
Take clindamycin 150 mg 4 times daily  See dentist as soon as possible.  Take ibuprofen, 400 mg, 4 times per day if needed for pain.  You may add acetaminophen 1000 mg 4 times per day if needed for pain.    You may add oxycodone 5 mg, 1-2 tablets up to every 6 hours if needed for pain.  Try to use this primarily only at night to help with sleep.    Do not use alcohol, operate machinery, drive, or climb on ladders, or perform other complex motor activity or make important decisions for 8 hours after taking oxycodone. Use docusate (100mg) 2 times a day to prevent constipation while on narcotics.  Be seen if you develop difficulty opening your mouth, increased swelling fevers or other worrisome symptoms if you have not already seen a dentist.  Many of these clinics offer a sliding fee option for patients that qualify, and see patients on a walk-in or same day basis. Please call each clinic directly. As services, hours, fees and policies vary greatly.          [facility], \[facility] [place] [facility]\""  [phone]  Sees no insurance  [facility], [place]  [phone]  Preventive services only  [facility] (mult loc) [phone]  [facility]    ([facility]), \[facility] [place] [facility]\""  [phone]  [facility]       [phone]  Preventive services only  [facility]  [phone]  Accepts MA & sees no ins  [facility],      Accepts MA & sees no ins   [place]   [phone]; [phone]  [facility]   Accepts MA & sees no ins       [phone]  [facility]ental [facility], \[facility] [place] [facility]\""  [phone]   Accepts MA emergencies  [facility] [phone]  [facility],     Accepts [facility]   [phone]    [facility] [phone]  Accepts MA & sees no ins   [facility]   Dental Clinic    [phone]  [facility], \[facility] [place] [facility]\""  [phone]    [facility] [phone]  [facility]  Preventive services only   [place]   [phone]  [facility] and [facility] [phone]  [facility], [place]  [phone]  Same day [facility] [phone]  Same day [facility],      Same day [facility]   [phone]    [facility], [facility] [phone]  Free clinic, walk-in only  [facility] (multiple locations) [phone]      [facility] , [facility] [phone]    [facility] [phone]  Free clinic, walk-in only  [facility]  [phone]  [facility] [phone] (adults)       [phone] (children)  [facility] [phone]    Also, referral service for low cost dental and healthcare: [phone]  And [phone]

## 2025-05-12 NOTE — ED TRIAGE NOTES
Patient reports left lower dental abscess that started 2 days ago. Denies pain but states he would like antibiotics until he can get to a dentist.     Triage Assessment (Adult)       Row Name 05/11/25 2029          Triage Assessment    Airway WDL WDL        Respiratory WDL    Respiratory WDL WDL        Skin Circulation/Temperature WDL    Skin Circulation/Temperature WDL WDL        Cardiac WDL    Cardiac WDL WDL        Peripheral/Neurovascular WDL    Peripheral Neurovascular WDL WDL        Cognitive/Neuro/Behavioral WDL    Cognitive/Neuro/Behavioral WDL WDL